# Patient Record
Sex: FEMALE | Race: WHITE | NOT HISPANIC OR LATINO | URBAN - NONMETROPOLITAN AREA
[De-identification: names, ages, dates, MRNs, and addresses within clinical notes are randomized per-mention and may not be internally consistent; named-entity substitution may affect disease eponyms.]

---

## 2021-01-13 NOTE — PATIENT DISCUSSION
No contact lenses today. Will call if wants to try CLs. discusses monovision OD reading or distance CL for OD.

## 2022-01-30 ENCOUNTER — PREPPED CHART (OUTPATIENT)
Dept: URBAN - NONMETROPOLITAN AREA CLINIC 4 | Facility: CLINIC | Age: 52
End: 2022-01-30

## 2022-01-31 ENCOUNTER — FOLLOW UP (OUTPATIENT)
Dept: URBAN - NONMETROPOLITAN AREA CLINIC 4 | Facility: CLINIC | Age: 52
End: 2022-01-31

## 2022-01-31 DIAGNOSIS — H11.441: ICD-10-CM

## 2022-01-31 PROCEDURE — 99213 OFFICE O/P EST LOW 20 MIN: CPT

## 2022-01-31 ASSESSMENT — VISUAL ACUITY
OU_SC: 20/25
OD_SC: 20/20
OS_SC: 20/25

## 2022-01-31 ASSESSMENT — TONOMETRY
OS_IOP_MMHG: 15
OD_IOP_MMHG: 16

## 2022-11-23 NOTE — PATIENT DISCUSSION
Non adapt to monovision. And not enough clarity with multifocal contact lenses. Glasses will give best vision at this time.

## 2022-11-23 NOTE — PATIENT DISCUSSION
Tracie Talib   [address]  [place] [address]    {[facility] PROVIDER LIST:065297}  {GI CHOOSE LOCATION:[id number]}        Please follow these instructions. Disregard instructions that are provided on the medication package.    Date of Procedure: ***  Check in at: ***   Procedure at: ***    Your laxative prescription (NuLytely) may be picked up from:  {Pharmacy List General:236479}    Colonoscopy Preparation Instructions  ?  **Please make arrangements for a responsible adult to drive you home. (A responsible adult is someone age 18 or older who can receive and understand instructions, stay with you, and call for assistance as instructed).**    Regarding Your Medications Prior to Your Procedure:  • Do not take any iron or iron-containing multivitamins beginning five days prior to your procedure.    • Blood-thinners typically need to be stopped a few days prior to the procedure. Check with your prescribing physician if you take blood thinners such as Coumadin (warfarin), Pradaxa, Plavix, Eliquis, Xarelto or Brilinta.  • Do not take aspirin or anti-inflammatory medications (Advil, Motrin, ibuprofen, Aleve, naproxen), in the morning on the day of your procedure.  • If you are diabetic:  o Take half of your usual dose of insulin the night before. DO NOT take insulin the morning of the procedure.  o Hold oral diabetic medications the night before and the morning of the procedure.     The Day Before the Procedure:  • Start a clear liquid diet at breakfast. Continue a clear liquid diet for the entire day in unlimited amounts. Clear liquids are listed below.   • In the morning, add tap water to the laxative container, shake well and place in the refrigerator.   • Lemonade flavor Crystal Lite mix, obtained at the grocery store, can also be used to improve the flavor. Once water is added, the solution is only good for 48 hours.   • At approximately 5 PM, (or when you are home for the evening), begin drinking the  Monitor. Nulytely solution. Drink 8 oz. Every 20 minutes until you have consumed half of the gallon of the solution.   • Walking will help the laxative move through the colon.     The Day of the Procedure:  • At *** AM begin drinking the remaining Nulytely solution. Drink 8 oz. Every 10 minutes until you have consumed the remainder of the gallon of the solution.   • You may take your morning medications with a sip of water, unless otherwise directed.    • Do not have anything by mouth after *** {AM PM:292696}.     Clear Liquid Diet:  Do not consume anything red or purple.    Beverages: soft drinks/soda, Gatorade or Edy-Aid, clear fruit juices without pulp, water, tea, coffee (no milk or non dairy creamer).   Broths: chicken, beef or vegetable.   Desserts: hard candies, Jell-O, Popsicles. (No fruit bars or sherbet)    If stools are not clear yellow the morning of the procedure, please call the GI lab at {[facility] lab phone numbers:[phone]}.         {[facility] FOR YOUR WELL BEIN}